# Patient Record
Sex: FEMALE | Race: WHITE | NOT HISPANIC OR LATINO | Employment: STUDENT | URBAN - NONMETROPOLITAN AREA
[De-identification: names, ages, dates, MRNs, and addresses within clinical notes are randomized per-mention and may not be internally consistent; named-entity substitution may affect disease eponyms.]

---
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
## 2024-01-02 NOTE — TELEPHONE ENCOUNTER
Per Mom:    [facility] Financial Aid Department    Letter needs to state she has been treated for depression/anxiety    They are trying to remove a course from her transcripts that she failed (patient states due to mental health) and financial aid needs this letter in order to have it removed

## 2024-01-02 NOTE — LETTER
January 2, 2024     Lesvia Murphy  [address] [place] [address]    Patient: Lesvia Murphy   YOB: 2005   Date of Visit: 1/2/2024       To Whom It May Concern,     Lesvia Murphy is being actively treated for depression and anxiety.    Exacerbation of either of these conditions can lead to impairment of scholastic performance for numerous reasons - both in completing required work and in the quality of work generated.     Please allow whatever exception you are able in Lesvia's academic struggle due her disease exacerbation.    Many thanks for your consideration in this matter.     Sincerely,           Rose Das DO  ______________________________________________________________________________________

## 2024-01-11 PROBLEM — L25.9 UNSPECIFIED CONTACT DERMATITIS, UNSPECIFIED CAUSE: Status: RESOLVED | Noted: 2022-02-22 | Resolved: 2024-01-11

## 2024-01-11 PROBLEM — J02.9 PHARYNGITIS: Status: RESOLVED | Noted: 2023-09-06 | Resolved: 2024-01-11

## 2024-01-11 PROBLEM — M25.579 ACUTE ANKLE PAIN: Status: RESOLVED | Noted: 2023-09-06 | Resolved: 2024-01-11

## 2024-01-11 PROBLEM — B34.9 VIRAL INFECTION: Status: RESOLVED | Noted: 2023-09-06 | Resolved: 2024-01-11

## 2024-01-11 PROBLEM — F41.9 ANXIETY: Status: RESOLVED | Noted: 2023-09-06 | Resolved: 2024-01-11

## 2024-01-11 PROBLEM — R11.2 NAUSEA AND VOMITING: Status: RESOLVED | Noted: 2023-09-06 | Resolved: 2024-01-11

## 2024-01-11 PROBLEM — S99.911A INJURY OF RIGHT ANKLE: Status: RESOLVED | Noted: 2023-09-06 | Resolved: 2024-01-11

## 2024-01-11 PROBLEM — Z00.00 HEALTHCARE MAINTENANCE: Status: ACTIVE | Noted: 2024-01-11

## 2024-01-11 PROBLEM — R51.9 HEADACHE: Status: RESOLVED | Noted: 2023-09-06 | Resolved: 2024-01-11

## 2024-01-11 PROBLEM — R50.9 FEVER: Status: RESOLVED | Noted: 2023-09-06 | Resolved: 2024-01-11

## 2024-01-11 PROBLEM — M79.10 MUSCLE PAIN: Status: RESOLVED | Noted: 2023-09-06 | Resolved: 2024-01-11

## 2024-08-06 ENCOUNTER — TELEPHONE (OUTPATIENT)
Dept: PRIMARY CARE | Facility: CLINIC | Age: 19
End: 2024-08-06
Payer: COMMERCIAL

## 2024-08-06 NOTE — TELEPHONE ENCOUNTER
Patients mom Nichole came in  said that they need a letter written from you so that she can get on campus housing. They got closed out of housing due to a mix up to [facility].  She left information that needs to be put in the letter. I am putting on your desk. She needs this by ASAP.

## 2024-08-08 ENCOUNTER — TELEMEDICINE (OUTPATIENT)
Dept: PRIMARY CARE | Facility: CLINIC | Age: 19
End: 2024-08-08
Payer: COMMERCIAL

## 2024-08-08 DIAGNOSIS — F41.8 DEPRESSION WITH ANXIETY: Primary | ICD-10-CM

## 2024-08-08 PROCEDURE — 99215 OFFICE O/P EST HI 40 MIN: CPT | Performed by: FAMILY MEDICINE

## 2024-08-08 PROCEDURE — 1036F TOBACCO NON-USER: CPT | Performed by: FAMILY MEDICINE

## 2024-08-08 RX ORDER — FLUOXETINE HYDROCHLORIDE 20 MG/1
20 CAPSULE ORAL DAILY
Qty: 30 CAPSULE | Refills: 3 | Status: SHIPPED | OUTPATIENT
Start: 2024-08-08

## 2024-08-08 ASSESSMENT — PATIENT HEALTH QUESTIONNAIRE - PHQ9
7. TROUBLE CONCENTRATING ON THINGS, SUCH AS READING THE NEWSPAPER OR WATCHING TELEVISION: NOT AT ALL
10. IF YOU CHECKED OFF ANY PROBLEMS, HOW DIFFICULT HAVE THESE PROBLEMS MADE IT FOR YOU TO DO YOUR WORK, TAKE CARE OF THINGS AT HOME, OR GET ALONG WITH OTHER PEOPLE: VERY DIFFICULT
5. POOR APPETITE OR OVEREATING: SEVERAL DAYS
8. MOVING OR SPEAKING SO SLOWLY THAT OTHER PEOPLE COULD HAVE NOTICED. OR THE OPPOSITE, BEING SO FIGETY OR RESTLESS THAT YOU HAVE BEEN MOVING AROUND A LOT MORE THAN USUAL: NOT AT ALL
4. FEELING TIRED OR HAVING LITTLE ENERGY: SEVERAL DAYS
9. THOUGHTS THAT YOU WOULD BE BETTER OFF DEAD, OR OF HURTING YOURSELF: NOT AT ALL
SUM OF ALL RESPONSES TO PHQ QUESTIONS 1-9: 7
1. LITTLE INTEREST OR PLEASURE IN DOING THINGS: MORE THAN HALF THE DAYS
2. FEELING DOWN, DEPRESSED OR HOPELESS: SEVERAL DAYS
3. TROUBLE FALLING OR STAYING ASLEEP OR SLEEPING TOO MUCH: SEVERAL DAYS
SUM OF ALL RESPONSES TO PHQ9 QUESTIONS 1 AND 2: 3
6. FEELING BAD ABOUT YOURSELF - OR THAT YOU ARE A FAILURE OR HAVE LET YOURSELF OR YOUR FAMILY DOWN: SEVERAL DAYS

## 2024-08-08 ASSESSMENT — ANXIETY QUESTIONNAIRES
2. NOT BEING ABLE TO STOP OR CONTROL WORRYING: NEARLY EVERY DAY
IF YOU CHECKED OFF ANY PROBLEMS ON THIS QUESTIONNAIRE, HOW DIFFICULT HAVE THESE PROBLEMS MADE IT FOR YOU TO DO YOUR WORK, TAKE CARE OF THINGS AT HOME, OR GET ALONG WITH OTHER PEOPLE: VERY DIFFICULT
7. FEELING AFRAID AS IF SOMETHING AWFUL MIGHT HAPPEN: MORE THAN HALF THE DAYS
4. TROUBLE RELAXING: MORE THAN HALF THE DAYS
5. BEING SO RESTLESS THAT IT IS HARD TO SIT STILL: NOT AT ALL
6. BECOMING EASILY ANNOYED OR IRRITABLE: MORE THAN HALF THE DAYS
1. FEELING NERVOUS, ANXIOUS, OR ON EDGE: NEARLY EVERY DAY
GAD7 TOTAL SCORE: 15
3. WORRYING TOO MUCH ABOUT DIFFERENT THINGS: NEARLY EVERY DAY

## 2024-08-08 NOTE — PROGRESS NOTES
Virtual or Telephone Consent    An interactive audio and video telecommunication system which permits real time communications between the patient (at the originating site) and provider (at the distant site) was utilized to provide this telehealth service.   Verbal consent was requested and obtained from Lesvia Murphy on this date, 08/08/24 for a telehealth visit.     Subjective   Patient ID: Lesvia Murphy is a 19 y.o. female who presents for Letter for School/Work (Pt presents to discuss letter for college- pt states no other concerns at this time. ).    HPI     VIRTUAL VISIT    Patient presents today for [employer] accomodation letter due to her anxiety and depression.    She was diagnosed with anxiety and depression in 2020.       Severe struggles with anxiety last year due to stress of sharing space in a dorm-   mood suffered, sleep impaired, focus diminished, physical manifestations of abdominal pain, gastrointestinal upset.  She was on medications at the time,  which had worked fairly well prior to her living situation at school.        Her symptoms of anxiety were exacerbated by communal living.   In the 2023 school year at [employer] Lesvia  lived in a double with a roommate she did not know.    Sharing space, having limited privacy, and forced interaction resulted in severe anxiety.  This led to decreased sleep from anxiety and stress.  She had trouble focusing on academics due to her constant heightened and anxious state.    She was distracted and nervous,  and found it very difficult to concentrate on schoolwork.    Increased stress manifested physically in stomach aches, which led to missing some classes.   Poor sleep led to fatigue with further decline in concentration and focus.    Lesvia felt as if her academics suffered from her anxiety.   She was, of note, taking medication for depression and anxiety, and apart from her school experience she was able to function well and had reasonable symptom control.    In 2022  Lesvia had all on line classes.  She had difficulty grasping concepts and keeping up with course load in the absence of live sessions.    Her grades suffered due to a  disconnect between her learning style and the on-line teaching format.           Commuting would take over 45 minutes each way, and she has anxiety when driving on the freeway, thus limiting that option to attend school at [employer].    Lesvia is hopeful that she will be granted a single dorm to limit the anxiety of sharing space.      She is also requesting close parking to her dormitory as even anticipating the possibility of solitary bus transportation from campus to her parking lot, particularly at night, induces strong feelings of anxiety and panic.      If granted the accommodations described above, the expectation is that her disability would be manageable and quite possibly improve with the continued adjustment of her medications incorporation of counseling services in combination with optimizing her living environment.      No side effects of meds in past.    Took and tolerated well.      Plans to speak to her advisor before next Wednesday to determine how to set up counseling through [facility]    She plans to use journaling, exercise, mindfulness in nature, and healthy eating in addition to counseling and medication to continue on her wellness journey.     Today her PHQ- 9  depression score was a 7, indicating mild depression.    She denies any suicidal ideation.     Her SHARI-7 score for anxiety was a 15,  which is categorized as severe anxiety     Has recently started exercising to help with mood.   Plans to buy her own food to eat a healthy diet rather than use meal plan.     Review of Systems   All other systems reviewed and are negative.      Objective   There were no vitals taken for this visit.    Physical Exam  Nursing note reviewed.     Visit conducted via telehealth in light of COVID-19 pandemic.    Video used     Gen: patient is alert and  oriented x 3  pleasant, and in no apparent distress.    Psychiatric: Patient has good eye contact. Mood and affect are appropriate. Well-groomed, logical thought process.  No pressured speech.      Assessment/Plan   Problem List Items Addressed This Visit             ICD-10-CM    Depression with anxiety - Primary F41.8     Resume fluoxetine 20 mg daily   Take for 1 month-  if tolerating but if room for improvement, take 2 caps daily and call for a refill of 60 tabs   Set up counseling services with [facility],   if not able - see resources below     MOOD  Continue present regimen.    As always, lifestyle measures as recommended below.    Nutrition plays a large role in how we feel, including influencing or mood.   For overall health as well as mental well-being, would  recommend limitation of refined carbohydrates such as pasta, pretzels, crackers, breads, alcohol, sugar sweetened foods or beverages , pastries, cereals, or bagels.   Recommend eating lots of vegetables, lean proteins, some fruits.  Eat small amounts of healthy fat daily, such as a handful of almonds or walnuts, a serving of salmon, a generous splash of olive oil on your salad, an avocado.      Physical activity also positively impacts mood. Anxiety and depression can be significantly improved with consistent exercise. The recommended long-term goal is 4-6 days per week, 30 minutes on those days. Even starting with 5 minutes of brisk walking a couple of times a day a few times a week is a great start.    Consider starting an exercise routine such as Yoga with Barbara , which has free yoga on line through You Tube.  Lluvia Naila walking videos also offer free exercise sessions on line.  For time efficient but challenging strength and cardio exercises, consider adding in some high intensity interval training (HIIT) sessions such as the free 7 min workout Tabata songs on You Tube .    Journaling, or writing down thoughts, can be therapeutic.   Starting or  continuing a gratitude journal to help you focus on positive things in life can make a big difference with depression or anxiety.  Consider writing down 3 things each day for which you are grateful, such as the sound of birds outside, the comfort of a cup of coffee, the company of a pet, etc.   Reflect back on that at the end of each week and relive those moments of gratitude.     Omega-3 fatty acid supplementation may also help mood. Fish oil or krill oil, up  to 1000 mg daily, can positively influence mood in addition to above lifestyle measures.     Meditation can be helpful for many challenges we face such as anxiety, depression, and stress.   Like exercise brings about fitness and well-being for the body, meditation can bring fitness to the mind creating a calmer, more positive mental state.   There are many good resources for more information on meditation, including the web sites  www.epu-bl-udnmmcwu.org  , www.calm.com,  smart phone applications such as Calm: Meditate and relax with guided mindfulness (free) ,  Meditation Studio by Muse, ($3.99)   Eric ($4.99), and Head Space (price varies, free intro with options for subscriptions).    It is recommended to remove yourself from technology and bustle every day -   take a 2 minute walk outside at lunch, take a hike for 15 minutes on the way home from work, get up 15 minutes early and find something peaceful and calming on which to focus, such as your breathing, sounds of nature, looking at trees or even a pleasant picture or houseplant.    Building calm into your day can set the tone, and quiet the undercurrent of thoughts. Daily practice can make a significant positive difference.    Counseling services can also be extremely helpful.  Call your insurance to see what agencies are covered. If covered, a commonly used agency by our patients is [facility] in [place] or [place] ([phone]).           Relevant Medications     FLUoxetine (PROzac) 20 mg capsule       Follow-up in 4-8 weeks virutal visit  for recheck of anxiety/ depression/panic attacks     Call for sooner follow-up if needed.     Time spent reviewing records, video portion of call with difficult audio portion leading to transitioning to completion of the visit on the phone,  followed by documentation and drafting of letter -  60 min    Scribe Attestation  By signing my name below, IBobbi Scribe   attest that this documentation has been prepared under the direction and in the presence of Rose Wall DO.

## 2024-08-08 NOTE — LETTER
August 8, 2024     Lesvia Murphy  [address] [place] [address]    Patient: Lesvia Murphy   YOB: 2005   Date of Visit: 8/8/2024       Dear Dr. Lesvia Murphy:    Thank you for referring Lesvia Murphy to me for evaluation. Below are my notes for this consultation.  If you have questions, please do not hesitate to call me. I look forward to following your patient along with you.       Sincerely,     Rose Wall, DO      CC: No Recipients  ______________________________________________________________________________________    Virtual or Telephone Consent    An interactive audio and video telecommunication system which permits real time communications between the patient (at the originating site) and provider (at the distant site) was utilized to provide this telehealth service.   Verbal consent was requested and obtained from Lesvia Murphy on this date, 08/08/24 for a telehealth visit.     Subjective  Patient ID: Lesvia Murphy is a 19 y.o. female who presents for No chief complaint on file..    HPI     VIRTUAL VISIT    Patient presents today for form completion.    Per 8/6/2024 telephone note:   Patients mom Nichole came in said that they need a letter written from you so that she can get on campus housing. They got closed out of housing due to a mix up to [employer]. She left information that needs to be put in the letter. I am putting on your desk. She needs this by [facility].         Review of Systems   All other systems reviewed and are negative.      Objective  There were no vitals taken for this visit.    Physical Exam  Nursing note reviewed.     Visit conducted via telehealth in light of COVID-19 pandemic.    Video used     Gen: patient is alert and oriented x 3  pleasant, and in no apparent distress.  Patient appears well, no cough, no dyspnea/tachypnea observed on video.   Psychiatric: Patient has good eye contact. Mood and affect are appropriate.      Assessment/Plan  {Assess/PlanSmartLinks:97628}    Follow-up in * for recheck *  Follow-up in * for routine care.  Follow-up in 1 year for annual physical.   Call for sooner follow-up if needed.       Scribe Attestation  By signing my name below, Bobbi BELTRAN Scribe   attest that this documentation has been prepared under the direction and in the presence of Rose Wall DO.

## 2024-08-08 NOTE — PATIENT INSTRUCTIONS
To Do     Call advisor for info on process to set up counseling services through school (to be done before 8-14-24    Start fluoxetine 20 mg once daily        Self care-   going on walks,  quiet nature,  consider journaling     Creative outlet -  journaling including gratitude journaling

## 2024-08-08 NOTE — ASSESSMENT & PLAN NOTE
Resume fluoxetine 20 mg daily   Take for 1 month-  if tolerating but if room for improvement, take 2 caps daily and call for a refill of 60 tabs   Set up counseling services with [facility],   if not able - see resources below     MOOD  Continue present regimen.    As always, lifestyle measures as recommended below.    Nutrition plays a large role in how we feel, including influencing or mood.   For overall health as well as mental well-being, would  recommend limitation of refined carbohydrates such as pasta, pretzels, crackers, breads, alcohol, sugar sweetened foods or beverages , pastries, cereals, or bagels.   Recommend eating lots of vegetables, lean proteins, some fruits.  Eat small amounts of healthy fat daily, such as a handful of almonds or walnuts, a serving of salmon, a generous splash of olive oil on your salad, an avocado.      Physical activity also positively impacts mood. Anxiety and depression can be significantly improved with consistent exercise. The recommended long-term goal is 4-6 days per week, 30 minutes on those days. Even starting with 5 minutes of brisk walking a couple of times a day a few times a week is a great start.    Consider starting an exercise routine such as Yoga with Barbara , which has free yoga on line through You Tube.  [employer] walking videos also offer free exercise sessions on line.  For time efficient but challenging strength and cardio exercises, consider adding in some high intensity interval training (HIIT) sessions such as the free 7 min workout Tabata songs on You Tube .    Journaling, or writing down thoughts, can be therapeutic.   Starting or continuing a gratitude journal to help you focus on positive things in life can make a big difference with depression or anxiety.  Consider writing down 3 things each day for which you are grateful, such as the sound of birds outside, the comfort of a cup of coffee, the company of a pet, etc.   Reflect back on that at the end  of each week and relive those moments of gratitude.     Omega-3 fatty acid supplementation may also help mood. Fish oil or krill oil, up  to 1000 mg daily, can positively influence mood in addition to above lifestyle measures.     Meditation can be helpful for many challenges we face such as anxiety, depression, and stress.   Like exercise brings about fitness and well-being for the body, meditation can bring fitness to the mind creating a calmer, more positive mental state.   There are many good resources for more information on meditation, including the web sites  www.khw-hn-jvlkkjjp.org  , www.calm.com,  smart phone applications such as Calm: Meditate and relax with guided mindfulness (free) ,  Meditation Studio by Muse, ($3.99)   Eric ($4.99), and Head Space (price varies, free intro with options for subscriptions).    It is recommended to remove yourself from technology and bustle every day -   take a 2 minute walk outside at lunch, take a hike for 15 minutes on the way home from work, get up 15 minutes early and find something peaceful and calming on which to focus, such as your breathing, sounds of nature, looking at trees or even a pleasant picture or houseplant.    Building calm into your day can set the tone, and quiet the undercurrent of thoughts. Daily practice can make a significant positive difference.    Counseling services can also be extremely helpful.  Call your insurance to see what agencies are covered. If covered, a commonly used agency by our patients is [facility] in [place] or [place] ([phone]).

## 2024-10-18 ENCOUNTER — APPOINTMENT (OUTPATIENT)
Dept: PRIMARY CARE | Facility: CLINIC | Age: 19
End: 2024-10-18
Payer: COMMERCIAL

## 2024-10-22 ENCOUNTER — APPOINTMENT (OUTPATIENT)
Dept: PRIMARY CARE | Facility: CLINIC | Age: 19
End: 2024-10-22
Payer: COMMERCIAL

## 2024-10-22 DIAGNOSIS — F41.8 DEPRESSION WITH ANXIETY: Primary | ICD-10-CM

## 2024-10-22 PROCEDURE — 99213 OFFICE O/P EST LOW 20 MIN: CPT | Performed by: FAMILY MEDICINE

## 2024-10-22 PROCEDURE — 1036F TOBACCO NON-USER: CPT | Performed by: FAMILY MEDICINE

## 2024-10-22 RX ORDER — FLUOXETINE HYDROCHLORIDE 20 MG/1
40 CAPSULE ORAL DAILY
Qty: 180 CAPSULE | Refills: 3 | Status: SHIPPED | OUTPATIENT
Start: 2024-10-22

## 2024-10-22 ASSESSMENT — PATIENT HEALTH QUESTIONNAIRE - PHQ9
6. FEELING BAD ABOUT YOURSELF - OR THAT YOU ARE A FAILURE OR HAVE LET YOURSELF OR YOUR FAMILY DOWN: NOT AT ALL
2. FEELING DOWN, DEPRESSED OR HOPELESS: SEVERAL DAYS
1. LITTLE INTEREST OR PLEASURE IN DOING THINGS: SEVERAL DAYS
4. FEELING TIRED OR HAVING LITTLE ENERGY: MORE THAN HALF THE DAYS
3. TROUBLE FALLING OR STAYING ASLEEP OR SLEEPING TOO MUCH: NOT AT ALL
10. IF YOU CHECKED OFF ANY PROBLEMS, HOW DIFFICULT HAVE THESE PROBLEMS MADE IT FOR YOU TO DO YOUR WORK, TAKE CARE OF THINGS AT HOME, OR GET ALONG WITH OTHER PEOPLE: NOT DIFFICULT AT ALL
8. MOVING OR SPEAKING SO SLOWLY THAT OTHER PEOPLE COULD HAVE NOTICED. OR THE OPPOSITE, BEING SO FIGETY OR RESTLESS THAT YOU HAVE BEEN MOVING AROUND A LOT MORE THAN USUAL: NOT AT ALL
9. THOUGHTS THAT YOU WOULD BE BETTER OFF DEAD, OR OF HURTING YOURSELF: NOT AT ALL
7. TROUBLE CONCENTRATING ON THINGS, SUCH AS READING THE NEWSPAPER OR WATCHING TELEVISION: SEVERAL DAYS
5. POOR APPETITE OR OVEREATING: NOT AT ALL
SUM OF ALL RESPONSES TO PHQ9 QUESTIONS 1 AND 2: 2
SUM OF ALL RESPONSES TO PHQ QUESTIONS 1-9: 5

## 2024-10-22 ASSESSMENT — ANXIETY QUESTIONNAIRES
7. FEELING AFRAID AS IF SOMETHING AWFUL MIGHT HAPPEN: NOT AT ALL
5. BEING SO RESTLESS THAT IT IS HARD TO SIT STILL: NOT AT ALL
GAD7 TOTAL SCORE: 7
3. WORRYING TOO MUCH ABOUT DIFFERENT THINGS: MORE THAN HALF THE DAYS
1. FEELING NERVOUS, ANXIOUS, OR ON EDGE: SEVERAL DAYS
6. BECOMING EASILY ANNOYED OR IRRITABLE: SEVERAL DAYS
4. TROUBLE RELAXING: SEVERAL DAYS
IF YOU CHECKED OFF ANY PROBLEMS ON THIS QUESTIONNAIRE, HOW DIFFICULT HAVE THESE PROBLEMS MADE IT FOR YOU TO DO YOUR WORK, TAKE CARE OF THINGS AT HOME, OR GET ALONG WITH OTHER PEOPLE: SOMEWHAT DIFFICULT
2. NOT BEING ABLE TO STOP OR CONTROL WORRYING: MORE THAN HALF THE DAYS

## 2024-10-22 NOTE — PROGRESS NOTES
Virtual or Telephone Consent    An interactive audio and video telecommunication system which permits real time communications between the patient (at the originating site) and provider (at the distant site) was utilized to provide this telehealth service.   Verbal consent was requested and obtained from Lesvia Murphy on this date, 10/22/24 for a telehealth visit.     Subjective   Patient ID: Lesvia Murphy is a 19 y.o. female who presents for Follow-up (Pt presents for 6-8 week follow up scales- pt states no new concerns/questions at this time. ).    HPI     VIRTUAL VISIT    Patient presents today for 6-8 week follow-up mood.    Patient concerns:  No new concerns or issues. Patient is doing well overall.     ROUTINE VISIT  CHRONIC CONDITIONS:    Mood  Diagnosed with primarily anxiety, secondarily depression in 2020   Suffers occasional panic attacks     Current regimen:  Fluoxetine 20 mg daily, to resume 8/2024  Counseling rec 8/2024, to try and set this up with Huan    PHQ-9: 5 - Q9 was 0  SHARI-7: 7    Patient states mood is doing better  Was previously feeling constantly on edge and like there was a pit in her stomach, both of which have improved.  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   Patient denies suicidal ideation or homicidal ideation.   Patient denies any symptoms of thierno such as pressured speech, impulsive purchases.  Patient reports good control on the current medication.  Patient is satisfied with their current regimen and wishes to continue.     Review of Systems   All other systems reviewed and are negative.      Objective   There were no vitals taken for this visit.    Physical Exam  Nursing note reviewed.     Visit conducted via telehealth in light of COVID-19 pandemic.    Video used     Gen:  patient is alert and oriented x 3  pleasant, and in no apparent distress.  Patient appears well, no cough, no dyspnea/tachypnea observed on video.   Psychiatric: Patient has good eye contact. Mood and affect are appropriate.     Assessment/Plan   Problem List Items Addressed This Visit             ICD-10-CM    Depression with anxiety - Primary F41.8     Scales and scores reviewed and discussed, doing well, much improved from prior visit.    Continue Fluoxetine 20 mg daily, occasionally takes 40 mg daily.  Rx refilled today at 40 mg daily dosing at patient request.    Keep up the great work with getting out in nature and healthy friendships.  Good job on getting to the gym, encouraged to do some sort of physical activity 5-6 days per week.      As always, lifestyle measures as recommended below.    Nutrition plays a large role in how we feel, including influencing or mood.   For overall health as well as mental well-being, would  recommend limitation of refined carbohydrates such as pasta, pretzels, crackers, breads, alcohol, sugar sweetened foods or beverages , pastries, cereals, or bagels.   Recommend eating lots of vegetables, lean proteins, some fruits.  Eat small amounts of healthy fat daily, such as a handful of almonds or walnuts, a serving of salmon, a generous splash of olive oil on your salad, an avocado.      Physical activity also positively impacts mood. Anxiety and depression can be significantly improved with consistent exercise. The recommended long-term goal is 4-6 days per week, 30 minutes on those days. Even starting with 5 minutes of brisk walking a couple of times a day a few times a week is a great start.    Consider starting an exercise routine such as Yoga with Barbara , which has free yoga on line through You Tube.  [employer] walking videos also offer free exercise sessions on line.  For time efficient but challenging strength and cardio exercises, consider adding in some high intensity  interval training (HIIT) sessions such as the free 7 min workout Tabata songs on You Tube .    Journaling, or writing down thoughts, can be therapeutic.   Starting or continuing a gratitude journal to help you focus on positive things in life can make a big difference with depression or anxiety.  Consider writing down 3 things each day for which you are grateful, such as the sound of birds outside, the comfort of a cup of coffee, the company of a pet, etc.   Reflect back on that at the end of each week and relive those moments of gratitude.     Omega-3 fatty acid supplementation may also help mood. Fish oil or krill oil, up  to 1000 mg daily, can positively influence mood in addition to above lifestyle measures.     Meditation can be helpful for many challenges we face such as anxiety, depression, and stress.   Like exercise brings about fitness and well-being for the body, meditation can bring fitness to the mind creating a calmer, more positive mental state.   There are many good resources for more information on meditation, including the web sites  www.opp-rr-iziysaxp.org  , www.calm.WeDemand,  smart phone applications such as Calm: Meditate and relax with guided mindfulness (free) ,  Meditation Studio by [employer], ($3.99)   Eric ($4.99), and Head Space (price varies, free intro with options for subscriptions).    It is recommended to remove yourself from technology and bustle every day -   take a 2 minute walk outside at lunch, take a hike for 15 minutes on the way home from work, get up 15 minutes early and find something peaceful and calming on which to focus, such as your breathing, sounds of nature, looking at trees or even a pleasant picture or houseplant.    Building calm into your day can set the tone, and quiet the undercurrent of thoughts. Daily practice can make a significant positive difference.    Counseling services can also be extremely helpful.  Call your insurance to see what agencies are covered. If  covered, a commonly used agency by our patients is [facility] in [place] or [place] ([phone]).          Relevant Medications    FLUoxetine (PROzac) 20 mg capsule         Follow-up in 6 months for recheck mood  Scales upon rooming.   Call for sooner follow-up if needed.         Scribe Attestation  By signing my name below, IBobbi Scribe   attest that this documentation has been prepared under the direction and in the presence of Rose Wall DO.

## 2024-10-22 NOTE — ASSESSMENT & PLAN NOTE
Scales and scores reviewed and discussed, doing well, much improved from prior visit.    Continue Fluoxetine 20 mg daily, occasionally takes 40 mg daily.  Rx refilled today at 40 mg daily dosing at patient request.    Keep up the great work with getting out in nature and healthy friendships.  Good job on getting to the gym, encouraged to do some sort of physical activity 5-6 days per week.      As always, lifestyle measures as recommended below.    Nutrition plays a large role in how we feel, including influencing or mood.   For overall health as well as mental well-being, would  recommend limitation of refined carbohydrates such as pasta, pretzels, crackers, breads, alcohol, sugar sweetened foods or beverages , pastries, cereals, or bagels.   Recommend eating lots of vegetables, lean proteins, some fruits.  Eat small amounts of healthy fat daily, such as a handful of almonds or walnuts, a serving of salmon, a generous splash of olive oil on your salad, an avocado.      Physical activity also positively impacts mood. Anxiety and depression can be significantly improved with consistent exercise. The recommended long-term goal is 4-6 days per week, 30 minutes on those days. Even starting with 5 minutes of brisk walking a couple of times a day a few times a week is a great start.    Consider starting an exercise routine such as Yoga with Barbara , which has free yoga on line through You Tube.  [employer] walking videos also offer free exercise sessions on line.  For time efficient but challenging strength and cardio exercises, consider adding in some high intensity interval training (HIIT) sessions such as the free 7 min workout Tabata songs on You Tube .    Journaling, or writing down thoughts, can be therapeutic.   Starting or continuing a gratitude journal to help you focus on positive things in life can make a big difference with depression or anxiety.  Consider writing down 3 things each day for which you are  grateful, such as the sound of birds outside, the comfort of a cup of coffee, the company of a pet, etc.   Reflect back on that at the end of each week and relive those moments of gratitude.     Omega-3 fatty acid supplementation may also help mood. Fish oil or krill oil, up  to 1000 mg daily, can positively influence mood in addition to above lifestyle measures.     Meditation can be helpful for many challenges we face such as anxiety, depression, and stress.   Like exercise brings about fitness and well-being for the body, meditation can bring fitness to the mind creating a calmer, more positive mental state.   There are many good resources for more information on meditation, including the web sites  www.bce-rk-fdugovnz.org  , www.calm.com,  smart phone applications such as Calm: Meditate and relax with guided mindfulness (free) ,  Meditation Studio by Muse, ($3.99)   Eric ($4.99), and Head Space (price varies, free intro with options for subscriptions).    It is recommended to remove yourself from technology and bustle every day -   take a 2 minute walk outside at lunch, take a hike for 15 minutes on the way home from work, get up 15 minutes early and find something peaceful and calming on which to focus, such as your breathing, sounds of nature, looking at trees or even a pleasant picture or houseplant.    Building calm into your day can set the tone, and quiet the undercurrent of thoughts. Daily practice can make a significant positive difference.    Counseling services can also be extremely helpful.  Call your insurance to see what agencies are covered. If covered, a commonly used agency by our patients is [facility] in [place] or [place] ([phone]).

## 2025-05-02 ENCOUNTER — APPOINTMENT (OUTPATIENT)
Dept: PRIMARY CARE | Facility: CLINIC | Age: 20
End: 2025-05-02
Payer: COMMERCIAL